# Patient Record
Sex: MALE | Race: BLACK OR AFRICAN AMERICAN
[De-identification: names, ages, dates, MRNs, and addresses within clinical notes are randomized per-mention and may not be internally consistent; named-entity substitution may affect disease eponyms.]

---

## 2018-04-03 NOTE — ED PHYSICIAN DOCUMENTATION
History of Present Illness





- Stated complaint


Stated Complaint: NECK PAIN





- Chief complaint


Chief Complaint: General





- Additonal information


Additional information: 





hx from pt


17 male


AD Raub


moving a bed last night, heavy lifting


felt a pop in his neck


L posterior neck pain since


throbbing


worse with movement aaron looking left


this AM his hands were numb and so was his L face


no weakness


no neuro sx now but pain persists





Review of Systems


Constitutional: denies: Fever


Cardiac: denies: Chest pain / pressure


Respiratory: denies: Dyspnea


Musculoskeletal: reports: Neck pain


Neurologic: reports: Numbness (hands and L jaw this AM better now).  denies: 

Focal weakness





PD PAST MEDICAL HISTORY





- Past Medical History


Past Medical History: No





- Past Surgical History


Past Surgical History: No





- Present Medications


Home Medications: 


 Ambulatory Orders











 Medication  Instructions  Recorded  Confirmed


 


Cyclobenzaprine [Flexeril] 10 mg PO TID PRN #20 tablet 04/03/18 


 


Ibuprofen [Motrin] 400 mg PO Q6H PRN #30 tablet 04/03/18 


 


Lidocaine Patch 5% [Lidoderm Patch] 1 each TOP DAILY PRN #10 patch 04/03/18 














- Allergies


Allergies/Adverse Reactions: 


 Allergies











Allergy/AdvReac Type Severity Reaction Status Date / Time


 


No Known Drug Allergies Allergy   Verified 04/03/18 09:27














- Social History


Does the pt smoke?: Yes


Smoking Status: Current every day smoker


Does the pt drink ETOH?: No


Does the pt have substance abuse?: No





- Immunizations


Immunizations are current?: Yes





PD ED PE NORMAL





- Vitals


Vital signs reviewed: Yes





- Neck


Neck: No bony TTP, Other (TTP left posterior neck and into trpa and deltoid 

regions with spasm and limited ROM, no pulsatile mass)





- Cardiac


Cardiac: RRR





- Respiratory


Respiratory: No respiratory distress, Clear bilaterally





- Extremities


Extremities: Other (+ radial pulses)





- Neuro


Neuro: No motor deficit, No sensory deficit, Other (at this time motor and 

sensation intact to all 4 ext)





Results





- Vitals


Vitals: 


 Vital Signs - 24 hr











  04/03/18





  09:23


 


Temperature 36.6 C


 


Heart Rate 79


 


Respiratory 18





Rate 


 


Blood Pressure 120/64


 


O2 Saturation 100








 Oxygen











O2 Source                      Room air

















- Rads (name of study)


  ** c spine


Radiology: See rad report (no fx no dislocation')





Departure





- Departure


Disposition: 01 Home, Self Care


Clinical Impression: 


 Neck muscle spasm





Acute neck sprain


Qualifiers:


 Encounter type: initial encounter Qualified Code(s): S13.9XXA - Sprain of 

joints and ligaments of unspecified parts of neck, initial encounter





Condition: Good


Instructions:  ED Sprain Strain Neck, ED Neck Back Pain General


Follow-Up: 


[facility] [place] [Provider Group]


Prescriptions: 


Cyclobenzaprine [Flexeril] 10 mg PO TID PRN #20 tablet


 PRN Reason: Spasms


Ibuprofen [Motrin] 400 mg PO Q6H PRN #30 tablet


 PRN Reason: Pain


Lidocaine Patch 5% [Lidoderm Patch] 1 each TOP DAILY PRN #10 patch


 PRN Reason: Pain


Comments: 


The xray looks fine


May wear the collar as needed


Follow up at [facility] if not better by Friday





Forms:  Activity restrictions

## 2018-04-03 NOTE — XRAY REPORT
EXAM:

CERVICAL SPINE RADIOGRAPHY

 

EXAM DATE: 4/3/2018 11:07 AM.

 

CLINICAL HISTORY: Heavy lifting felt a pop L posterior pain.

 

COMPARISONS: None.

 

TECHNIQUE: 3 views.

 

FINDINGS:

Alignment: Generalized cervical kyphosis versus patient positioning.

 

Bones: The cervical vertebral bodies and posterior elements are well visualized from the skull base t
hrough C7-T1. No fractures or bone lesions.

 

Disks: Normal. Disk heights are maintained.

 

Facets: No degenerative disease.

 

Soft Tissues: Normal. No prevertebral soft tissue swelling. The visualized lung apices are clear.

 

IMPRESSION: No evidence for fracture or degenerative changes.

 

[facility]

Referring Provider Line: [phone]

 

SITE ID: [id number]

## 2018-08-26 NOTE — XRAY REPORT
Reason:  laceration, hand pain, eval for FB

Procedure Date:  08/26/2018   

Accession Number:  [id number] / [id number]                    

Procedure:  XR  - Hand 3 View LT CPT Code:  

 

FULL RESULT:

 

 

EXAM:

LEFT HAND RADIOGRAPHY

 

EXAM DATE: 8/26/2018 03:25 AM.

 

CLINICAL HISTORY: Laceration, pain, question foreign body

 

COMPARISON: None.

 

TECHNIQUE: 3 views.

 

FINDINGS:

Bones: Normal. No fractures or bone lesions.

 

Joints: Normal. No subluxations.

 

Soft Tissues: Soft tissue laceration adjacent to the fifth metacarpal. No 

radiopaque foreign body seen in the soft tissues.

IMPRESSION: Soft tissue laceration. No evidence of fracture or radiopaque 

foreign body.

 

[facility]

## 2018-08-26 NOTE — ED PHYSICIAN DOCUMENTATION
PD HPI UPPER EXT INJURY





- Stated complaint


Stated Complaint: L HAND LAC





- Chief complaint


Chief Complaint: Laceration





- History obtained from


History obtained from: Patient





- History of Present Illness


Location: Left, Hand


Type of injury: Laceration


Where injury occurred: Home


Timing - onset: Today


Timing - details: Abrupt onset


Severity Comments: moderate


Improved by: Nothing


Worsened by: Moving


Associated symptoms: Other.  No: Numbness, Tingling


Contributing factors: No: Anticoagulated


Similar symptoms before: Has not had sx before


Recently seen: Not recently seen





Review of Systems


Constitutional: denies: Fever


Eyes: denies: Loss of vision


Throat: denies: Sore throat


Skin: reports: Laceration (s)


Musculoskeletal: reports: Extremity pain


Immunocompromised: denies: Chemotherapy





PD PAST MEDICAL HISTORY





- Past Surgical History


Past Surgical History: No





- Present Medications


Home Medications: 


 Ambulatory Orders











 Medication  Instructions  Recorded  Confirmed


 


No Known Home Medications [No  08/26/18 08/26/18





Known Home Medications]   














- Allergies


Allergies/Adverse Reactions: 


 Allergies











Allergy/AdvReac Type Severity Reaction Status Date / Time


 


No Known Drug Allergies Allergy   Verified 08/26/18 02:54














- Social History


Does the pt smoke?: Yes


Smoking Status: Current every day smoker


Does the pt drink ETOH?: No


Does the pt have substance abuse?: No





- Immunizations


Immunizations are current?: Yes





PD ED PE NORMAL





- General


General: Alert and oriented X 3, No acute distress





- HEENT


HEENT: Atraumatic, PERRL, EOMI





- Respiratory


Respiratory: No respiratory distress





- Derm


Derm: Other (Irregular laceration to the dorsal aspect of the left hand which 

is roughly 2-1/2 cm)





- Extremities


Extremities: Normal ROM s pain (The patient has full active range of motion of 

the hand, the patient's motor and sensory function are intact and there is a 

normal radial pulse.  The patient has a 2-1/2 cm laceration on the dorsum of 

the hand which is a very irregular wound.  There is no evidence of foreign body

, tendon or ligamentous injury).  No: No tenderness to palpate





Results





- Vitals


Vitals: 





 Vital Signs - 24 hr











  08/26/18





  02:52


 


Temperature 36.1 C L


 


Heart Rate 104 H


 


Respiratory 18





Rate 


 


Blood Pressure 154/87 H


 


O2 Saturation 100








 Oxygen











O2 Source                      Room air

















- Rads (name of study)


  ** XR hand


Radiology: Final report received





Procedures





- Laceration (location)


  ** Hand left


Length in cm: 2.5


Wound type: Irregular


Neurovascular status: Sensory intact, Motor intact, Vascular intact


Tendon involvement: Tendon intact, Tendon Injury


Anesthesia: Lidocaine 1%


Wound Preparation: Betadine, Irrigated copiously NS


Skin layer closure: Interrupted, Size #-0 - enter number


Other: Patient tolerated well, Tetanus UTD


Complexity: Simple





PD MEDICAL DECISION MAKING





- ED course


ED course: 





No evidence of foreign body, no evidence of ligamentous or tendon injury.  The 

wound was closed loosely.  I discussed with the patient the irregular nature 

and the probability of a significant scar.  The patient understands.  I advised 

follow-up with primary care.  I advised to have the sutures removed in 7-10 

days.  The patient understands and agrees.  The patient will return to the 

emergency department for worsening symptoms or any concerns





- Sepsis Event


Vital Signs: 





 Vital Signs - 24 hr











  08/26/18





  02:52


 


Temperature 36.1 C L


 


Heart Rate 104 H


 


Respiratory 18





Rate 


 


Blood Pressure 154/87 H


 


O2 Saturation 100








 Oxygen











O2 Source                      Room air

















Departure





- Departure


Disposition: 01 Home, Self Care


Clinical Impression: 


Hand laceration


Qualifiers:


 Encounter type: initial encounter Foreign body presence: unspecified Laterality

: unspecified laterality Qualified Code(s): S61.419A - Laceration without 

foreign body of unspecified hand, initial encounter





Condition: Good


Instructions:  ED Laceration All


Comments: 


Please have your sutures removed in 7-10 days.  Please return to the emergency 

department immediately for worsening symptoms or any concerns

## 2019-06-16 ENCOUNTER — HOSPITAL ENCOUNTER (EMERGENCY)
Dept: HOSPITAL 76 - ED | Age: 19
Discharge: HOME | End: 2019-06-16
Payer: COMMERCIAL

## 2019-06-16 VITALS — DIASTOLIC BLOOD PRESSURE: 56 MMHG | SYSTOLIC BLOOD PRESSURE: 134 MMHG
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
## 2019-06-16 DIAGNOSIS — S89.91XA: Primary | ICD-10-CM

## 2019-06-16 DIAGNOSIS — F17.200: ICD-10-CM

## 2019-06-16 PROCEDURE — 73564 X-RAY EXAM KNEE 4 OR MORE: CPT

## 2019-06-16 PROCEDURE — 99283 EMERGENCY DEPT VISIT LOW MDM: CPT

## 2019-06-16 NOTE — XRAY REPORT
Reason:  fall, pain

Procedure Date:  06/16/2019   

Accession Number:  [id number] / [id number]                    

Procedure:  XR  - Knee 4 View RT CPT Code:  

 

FULL RESULT:

 

 

EXAM:

RIGHT KNEE RADIOGRAPHY

 

EXAM DATE: 6/16/2019 04:38 PM.

 

CLINICAL HISTORY: Fall. Pain.

 

COMPARISON: None.

 

TECHNIQUE: 4 views.

 

FINDINGS:

Bones: Normal. No fractures or bone lesions.

 

Joints: Joint effusion. No subluxations.

 

Soft Tissues: Normal. No soft tissue swelling.

IMPRESSION:

1. No bony abnormality.

2. Joint effusion noted.

 

[facility]

## 2019-06-16 NOTE — ED PHYSICIAN DOCUMENTATION
PD HPI LOWER EXT INJURY





- Stated complaint


Stated Complaint: RT KNEE INJ





- Chief complaint


Chief Complaint: Trauma Ext





- History obtained from


History obtained from: Patient





- History of Present Illness


PD HPI LOW EXT INJURY LOCATION: Right, Knee


Where injury occurred: Other (he was dancing last night and had gotten low in 

stance, dancing, and felt a pain in the right knee. Mostly felt like it gave 

out. Has pain with walking, at the anterior part of the knee.)


Timing - onset: Last night


Timing - duration: Days (1)


Improved by: Rest


Worsened by: Moving (extension), Palpating, Other (pivoting on foot hurts in 

front of knee.)


Associated symptoms: No: Weakness, Numbness, Swelling


Similar symptoms before: Has not had sx before


Recently seen: Not recently seen





Review of Systems


Skin: denies: Rash, Lesions


Musculoskeletal: denies: Back pain


Neurologic: denies: Focal weakness, Numbness





PD PAST MEDICAL HISTORY





- Past Medical History


Past Medical History: No





- Past Surgical History


Past Surgical History: No





- Present Medications


Home Medications: 


                                Ambulatory Orders











 Medication  Instructions  Recorded  Confirmed


 


Ibuprofen 600 mg PO TID PRN #25 tablet 06/16/19 














- Allergies


Allergies/Adverse Reactions: 


                                    Allergies











Allergy/AdvReac Type Severity Reaction Status Date / Time


 


No Known Drug Allergies Allergy   Verified 06/16/19 16:05














- Social History


Does the pt smoke?: Yes


Smoking Status: Current every day smoker


Does the pt drink ETOH?: No


Does the pt have substance abuse?: No





- Immunizations


Immunizations are current?: Yes





PD ED PE NORMAL





- Vitals


Vital signs reviewed: Yes





- General


General: Alert and oriented X 3, No acute distress, Well developed/nourished





- Derm


Derm: Normal color, Warm and dry





- Extremities


Extremities: Other (right knee with tenderness anteriorly in suprapatellar area.

Ligament testing without pain nor laxity. Some pain with rotation and pressure. 

Seems likely anterior patellar tendon strain, but consider meniscal contusion. )





Results





- Vitals


Vitals: 


                                     Oxygen











O2 Source                      Room air

















PD MEDICAL DECISION MAKING





- ED course


Complexity details: reviewed results (xray normal), considered differential 

(likely patellar tendon strain, but consider meniscal contusion. ), d/w patient





Departure





- Departure


Disposition: 01 Home, Self Care


Clinical Impression: 


Knee injury


Qualifiers:


 Encounter type: initial encounter Laterality: right Qualified Code(s): S89.91XA

- Unspecified injury of right lower leg, initial encounter





Condition: Stable


Record reviewed to determine appropriate education?: Yes


Instructions:  ED Meniscal Injury Knee Poss


Follow-Up: 


[facility] [place] [Provider Group]


Prescriptions: 


Ibuprofen 600 mg PO TID PRN #25 tablet


 PRN Reason: Pain


Comments: 


Use the knee brace when up and around.  Crutches for partial to nonweightbearing

for the next several days.  Ibuprofen 3 times a day with food for discomfort and

swelling.  Follow-up with your primary care or orthopedics and base in 3 to 5 

days, call for an appointment.


Forms:  Activity restrictions


Discharge Date/Time: 06/16/19 19:47